# Patient Record
Sex: MALE | Race: WHITE | NOT HISPANIC OR LATINO
[De-identification: names, ages, dates, MRNs, and addresses within clinical notes are randomized per-mention and may not be internally consistent; named-entity substitution may affect disease eponyms.]

---

## 2019-12-09 PROBLEM — Z00.00 ENCOUNTER FOR PREVENTIVE HEALTH EXAMINATION: Status: ACTIVE | Noted: 2019-12-09

## 2020-01-06 ENCOUNTER — APPOINTMENT (OUTPATIENT)
Dept: SURGERY | Facility: CLINIC | Age: 70
End: 2020-01-06
Payer: MEDICARE

## 2020-01-06 VITALS
HEART RATE: 89 BPM | OXYGEN SATURATION: 96 % | DIASTOLIC BLOOD PRESSURE: 89 MMHG | SYSTOLIC BLOOD PRESSURE: 162 MMHG | TEMPERATURE: 97.4 F | WEIGHT: 315 LBS | BODY MASS INDEX: 53.12 KG/M2 | HEIGHT: 64.5 IN

## 2020-01-06 DIAGNOSIS — I10 ESSENTIAL (PRIMARY) HYPERTENSION: ICD-10-CM

## 2020-01-06 DIAGNOSIS — E66.01 MORBID (SEVERE) OBESITY DUE TO EXCESS CALORIES: ICD-10-CM

## 2020-01-06 DIAGNOSIS — Z72.89 OTHER PROBLEMS RELATED TO LIFESTYLE: ICD-10-CM

## 2020-01-06 DIAGNOSIS — N40.0 BENIGN PROSTATIC HYPERPLASIA WITHOUT LOWER URINARY TRACT SYMPMS: ICD-10-CM

## 2020-01-06 DIAGNOSIS — Z82.49 FAMILY HISTORY OF ISCHEMIC HEART DISEASE AND OTHER DISEASES OF THE CIRCULATORY SYSTEM: ICD-10-CM

## 2020-01-06 DIAGNOSIS — K46.9 UNSPECIFIED ABDOMINAL HERNIA W/OUT OBSTRUCTION OR GANGRENE: ICD-10-CM

## 2020-01-06 PROCEDURE — 99203 OFFICE O/P NEW LOW 30 MIN: CPT

## 2020-01-06 RX ORDER — TAMSULOSIN HYDROCHLORIDE 0.4 MG/1
0.4 CAPSULE ORAL
Refills: 0 | Status: ACTIVE | COMMUNITY

## 2020-01-06 RX ORDER — AMLODIPINE AND ATORVASTATIN 2.5; 4 MG/1; MG/1
TABLET, COATED ORAL
Refills: 0 | Status: ACTIVE | COMMUNITY

## 2020-01-29 PROBLEM — E66.01 MORBID OBESITY WITH BMI OF 50.0-59.9, ADULT: Status: ACTIVE | Noted: 2020-01-29

## 2020-01-29 NOTE — PHYSICAL EXAM
[Normal Breath Sounds] : Normal breath sounds [Normal Heart Sounds] : normal heart sounds [Alert] : alert [Oriented to Person] : oriented to person [Oriented to Time] : oriented to time [Oriented to Place] : oriented to place [Calm] : calm [JVD] : no jugular venous distention  [Abdomen Tenderness] : ~T ~M No abdominal tenderness [Abdominal Masses] : No abdominal masses [Tender] : was nontender [Enlarged] : not enlarged [Purpura] : no purpura  [de-identified] : SYDNEY. Jeremy. Appropriate. Comfortable. [de-identified] : Supple. Trachea midline. No overt lymphadenopathy. No JVD [de-identified] : Pupils equal. No Scleral Icterus. NCAT. [de-identified] : Abdomen is soft, non tender and non distended. Do not appreciate any overt mass. The examination is limited by body habitus, there is an umbilical hernia with some thinning of the skin. Non tender. Likely fat containing. It is reducible.

## 2020-01-29 NOTE — ASSESSMENT
[FreeTextEntry1] : 69 year old male with morbid obesity BMI 55. Longstanding reducible umbilical hernia. Discussed with patient that hernia is almost certainly not the cause of his shortness of breath, and he needs to establish care with other professionals to evaluate his heart and lungs. He is not a candidate for elective hernia repair given the high complication and recurrence rate of this type of surgery at his current weight and no pressing need to repair given lack of troublesome symptoms. We discussed that it would be very reasonable to discuss weight loss surgery with some of my colleagues as a potential tool to help him reduce his risk which he was amenable to. I answered all questions. Notably greater than 50% of todays 30 minute visit was spent on counseling and coordination of his care.

## 2020-01-29 NOTE — HISTORY OF PRESENT ILLNESS
[de-identified] : This is 69 year old male with multiple medical comorbid conditions including HTN, BPH, and obesity (body mass index 55). Reports having a longstanding hernia at the navel. Reports that recently he has been getting short of breath when exerting himself and felt as though this must be related to the hernia which he reports has grown in size over the years. He denies any obstructive symptoms. He denies any issues with his bowel movements or passing flatus. He worked as a teacher prior to retiring years ago. He is here to discuss potential options for repair of the hernia.

## 2020-02-10 ENCOUNTER — APPOINTMENT (OUTPATIENT)
Dept: GASTROENTEROLOGY | Facility: CLINIC | Age: 70
End: 2020-02-10

## 2020-03-23 ENCOUNTER — APPOINTMENT (OUTPATIENT)
Dept: PULMONOLOGY | Facility: CLINIC | Age: 70
End: 2020-03-23